# Patient Record
Sex: MALE | Race: WHITE | NOT HISPANIC OR LATINO | Employment: FULL TIME | URBAN - METROPOLITAN AREA
[De-identification: names, ages, dates, MRNs, and addresses within clinical notes are randomized per-mention and may not be internally consistent; named-entity substitution may affect disease eponyms.]

---

## 2021-05-29 ENCOUNTER — RECORDS - HEALTHEAST (OUTPATIENT)
Dept: ADMINISTRATIVE | Facility: CLINIC | Age: 31
End: 2021-05-29

## 2021-05-30 ENCOUNTER — RECORDS - HEALTHEAST (OUTPATIENT)
Dept: ADMINISTRATIVE | Facility: CLINIC | Age: 31
End: 2021-05-30

## 2022-12-02 ENCOUNTER — APPOINTMENT (OUTPATIENT)
Dept: RADIOLOGY | Facility: CLINIC | Age: 32
End: 2022-12-02
Attending: STUDENT IN AN ORGANIZED HEALTH CARE EDUCATION/TRAINING PROGRAM
Payer: COMMERCIAL

## 2022-12-02 ENCOUNTER — APPOINTMENT (OUTPATIENT)
Dept: MRI IMAGING | Facility: CLINIC | Age: 32
End: 2022-12-02
Attending: STUDENT IN AN ORGANIZED HEALTH CARE EDUCATION/TRAINING PROGRAM
Payer: COMMERCIAL

## 2022-12-02 ENCOUNTER — HOSPITAL ENCOUNTER (EMERGENCY)
Facility: CLINIC | Age: 32
Discharge: HOME | End: 2022-12-02
Attending: STUDENT IN AN ORGANIZED HEALTH CARE EDUCATION/TRAINING PROGRAM | Admitting: STUDENT IN AN ORGANIZED HEALTH CARE EDUCATION/TRAINING PROGRAM
Payer: COMMERCIAL

## 2022-12-02 VITALS
HEIGHT: 72 IN | BODY MASS INDEX: 23.42 KG/M2 | OXYGEN SATURATION: 98 % | SYSTOLIC BLOOD PRESSURE: 133 MMHG | TEMPERATURE: 99.9 F | HEART RATE: 79 BPM | RESPIRATION RATE: 15 BRPM | DIASTOLIC BLOOD PRESSURE: 87 MMHG | WEIGHT: 172.9 LBS

## 2022-12-02 DIAGNOSIS — R20.2 PARESTHESIAS: ICD-10-CM

## 2022-12-02 LAB
ALBUMIN SERPL-MCNC: 4.5 G/DL (ref 3.5–5)
ALP SERPL-CCNC: 54 U/L (ref 45–120)
ALT SERPL W P-5'-P-CCNC: 20 U/L (ref 0–45)
ANION GAP SERPL CALCULATED.3IONS-SCNC: 9 MMOL/L (ref 5–18)
AST SERPL W P-5'-P-CCNC: 17 U/L (ref 0–40)
ATRIAL RATE - MUSE: 72 BPM
BASOPHILS # BLD AUTO: 0 10E3/UL (ref 0–0.2)
BASOPHILS NFR BLD AUTO: 1 %
BILIRUB SERPL-MCNC: 1.4 MG/DL (ref 0–1)
BUN SERPL-MCNC: 11 MG/DL (ref 8–22)
CALCIUM SERPL-MCNC: 9.5 MG/DL (ref 8.5–10.5)
CHLORIDE BLD-SCNC: 104 MMOL/L (ref 98–107)
CO2 SERPL-SCNC: 28 MMOL/L (ref 22–31)
CREAT SERPL-MCNC: 0.9 MG/DL (ref 0.7–1.3)
D DIMER PPP FEU-MCNC: <=0.27 UG/ML FEU (ref 0–0.5)
DIASTOLIC BLOOD PRESSURE - MUSE: NORMAL MMHG
EOSINOPHIL # BLD AUTO: 0.4 10E3/UL (ref 0–0.7)
EOSINOPHIL NFR BLD AUTO: 7 %
ERYTHROCYTE [DISTWIDTH] IN BLOOD BY AUTOMATED COUNT: 12.5 % (ref 10–15)
GFR SERPL CREATININE-BSD FRML MDRD: >90 ML/MIN/1.73M2
GLUCOSE BLD-MCNC: 93 MG/DL (ref 70–125)
GLUCOSE BLDC GLUCOMTR-MCNC: 97 MG/DL (ref 70–99)
HCT VFR BLD AUTO: 46 % (ref 40–53)
HGB BLD-MCNC: 15.1 G/DL (ref 13.3–17.7)
IMM GRANULOCYTES # BLD: 0 10E3/UL
IMM GRANULOCYTES NFR BLD: 0 %
INTERPRETATION ECG - MUSE: NORMAL
LYMPHOCYTES # BLD AUTO: 2 10E3/UL (ref 0.8–5.3)
LYMPHOCYTES NFR BLD AUTO: 32 %
MAGNESIUM SERPL-MCNC: 2.2 MG/DL (ref 1.8–2.6)
MCH RBC QN AUTO: 27 PG (ref 26.5–33)
MCHC RBC AUTO-ENTMCNC: 32.8 G/DL (ref 31.5–36.5)
MCV RBC AUTO: 82 FL (ref 78–100)
MONOCYTES # BLD AUTO: 0.4 10E3/UL (ref 0–1.3)
MONOCYTES NFR BLD AUTO: 7 %
NEUTROPHILS # BLD AUTO: 3.4 10E3/UL (ref 1.6–8.3)
NEUTROPHILS NFR BLD AUTO: 53 %
NRBC # BLD AUTO: 0 10E3/UL
NRBC BLD AUTO-RTO: 0 /100
P AXIS - MUSE: 48 DEGREES
PLATELET # BLD AUTO: 283 10E3/UL (ref 150–450)
POTASSIUM BLD-SCNC: 4.1 MMOL/L (ref 3.5–5)
PR INTERVAL - MUSE: 140 MS
PROT SERPL-MCNC: 7.7 G/DL (ref 6–8)
QRS DURATION - MUSE: 124 MS
QT - MUSE: 408 MS
QTC - MUSE: 446 MS
R AXIS - MUSE: 72 DEGREES
RBC # BLD AUTO: 5.6 10E6/UL (ref 4.4–5.9)
SODIUM SERPL-SCNC: 141 MMOL/L (ref 136–145)
SYSTOLIC BLOOD PRESSURE - MUSE: NORMAL MMHG
T AXIS - MUSE: 62 DEGREES
TROPONIN I SERPL-MCNC: <0.01 NG/ML (ref 0–0.29)
VENTRICULAR RATE- MUSE: 72 BPM
WBC # BLD AUTO: 6.3 10E3/UL (ref 4–11)

## 2022-12-02 PROCEDURE — 83735 ASSAY OF MAGNESIUM: CPT | Performed by: STUDENT IN AN ORGANIZED HEALTH CARE EDUCATION/TRAINING PROGRAM

## 2022-12-02 PROCEDURE — 85379 FIBRIN DEGRADATION QUANT: CPT | Performed by: STUDENT IN AN ORGANIZED HEALTH CARE EDUCATION/TRAINING PROGRAM

## 2022-12-02 PROCEDURE — 99285 EMERGENCY DEPT VISIT HI MDM: CPT | Mod: 25

## 2022-12-02 PROCEDURE — 80053 COMPREHEN METABOLIC PANEL: CPT | Performed by: STUDENT IN AN ORGANIZED HEALTH CARE EDUCATION/TRAINING PROGRAM

## 2022-12-02 PROCEDURE — 85025 COMPLETE CBC W/AUTO DIFF WBC: CPT | Performed by: STUDENT IN AN ORGANIZED HEALTH CARE EDUCATION/TRAINING PROGRAM

## 2022-12-02 PROCEDURE — 255N000002 HC RX 255 OP 636: Performed by: EMERGENCY MEDICINE

## 2022-12-02 PROCEDURE — 71046 X-RAY EXAM CHEST 2 VIEWS: CPT

## 2022-12-02 PROCEDURE — 36415 COLL VENOUS BLD VENIPUNCTURE: CPT | Performed by: STUDENT IN AN ORGANIZED HEALTH CARE EDUCATION/TRAINING PROGRAM

## 2022-12-02 PROCEDURE — 96374 THER/PROPH/DIAG INJ IV PUSH: CPT | Mod: 59

## 2022-12-02 PROCEDURE — 93005 ELECTROCARDIOGRAM TRACING: CPT | Performed by: STUDENT IN AN ORGANIZED HEALTH CARE EDUCATION/TRAINING PROGRAM

## 2022-12-02 PROCEDURE — 250N000011 HC RX IP 250 OP 636: Performed by: STUDENT IN AN ORGANIZED HEALTH CARE EDUCATION/TRAINING PROGRAM

## 2022-12-02 PROCEDURE — A9585 GADOBUTROL INJECTION: HCPCS | Performed by: EMERGENCY MEDICINE

## 2022-12-02 PROCEDURE — 70549 MR ANGIOGRAPH NECK W/O&W/DYE: CPT

## 2022-12-02 PROCEDURE — 70553 MRI BRAIN STEM W/O & W/DYE: CPT

## 2022-12-02 PROCEDURE — 70544 MR ANGIOGRAPHY HEAD W/O DYE: CPT

## 2022-12-02 PROCEDURE — 84484 ASSAY OF TROPONIN QUANT: CPT | Performed by: STUDENT IN AN ORGANIZED HEALTH CARE EDUCATION/TRAINING PROGRAM

## 2022-12-02 RX ORDER — GADOBUTROL 604.72 MG/ML
10 INJECTION INTRAVENOUS ONCE
Status: COMPLETED | OUTPATIENT
Start: 2022-12-02 | End: 2022-12-02

## 2022-12-02 RX ORDER — CYCLOBENZAPRINE HCL 10 MG
10 TABLET ORAL 3 TIMES DAILY PRN
Qty: 20 TABLET | Refills: 0 | Status: SHIPPED | OUTPATIENT
Start: 2022-12-02 | End: 2022-12-08

## 2022-12-02 RX ORDER — KETOROLAC TROMETHAMINE 15 MG/ML
15 INJECTION, SOLUTION INTRAMUSCULAR; INTRAVENOUS ONCE
Status: COMPLETED | OUTPATIENT
Start: 2022-12-02 | End: 2022-12-02

## 2022-12-02 RX ADMIN — GADOBUTROL 10 ML: 604.72 INJECTION INTRAVENOUS at 18:01

## 2022-12-02 RX ADMIN — KETOROLAC TROMETHAMINE 15 MG: 15 INJECTION, SOLUTION INTRAMUSCULAR; INTRAVENOUS at 13:34

## 2022-12-02 ASSESSMENT — ACTIVITIES OF DAILY LIVING (ADL)
ADLS_ACUITY_SCORE: 33
ADLS_ACUITY_SCORE: 35

## 2022-12-02 NOTE — ED NOTES
Expected Patient Referral to ED  12:48 PM    Referring Clinic/Provider:  [facility]    Reason for referral/Clinical facts:  Upper neck pain since yesterday    Arm pain and chest pain and SOB        Recommendations provided:  Send to ED for further evaluation    Caller was informed that this institution does possess the capabilities and/or resources to provide for patient and should be transferred to our facility.    Discussed that if direct admit is sought and any hurdles are encountered, this ED would be happy to see the patient and evaluate.    Informed caller that recommendations provided are recommendations based only on the facts provided and that they responsible to accept or reject the advice, or to seek a formal in person consultation as needed and that this ED will see/treat patient should they arrive.      Manolo Aragon DO  [facility] EMERGENCY ROOM  [address] [address] [phone]       Manolo Aragon DO  12/02/22 1249

## 2022-12-02 NOTE — ED TRIAGE NOTES
Patient presents to the ED with right sided chest pain, neck pain, and right arm pain that started yesterday at 2pm. Was up in the middle of the night and noticed some right sided weakness to arm and leg and some difficulty with word finding. MD called to bedside for assessment. Blood sugar 97 in triage.     Triage Assessment     Row Name 12/02/22 1302       Triage Assessment (Adult)    Airway WDL WDL       Respiratory WDL    Respiratory WDL WDL       Skin Circulation/Temperature WDL    Skin Circulation/Temperature WDL WDL       Cardiac WDL    Cardiac WDL X;chest pain       Chest Pain Assessment    Chest Pain Location shoulder, right    Chest Pain Radiation arm    Character sharp       Peripheral/Neurovascular WDL    Peripheral Neurovascular WDL WDL       Cognitive/Neuro/Behavioral WDL    Cognitive/Neuro/Behavioral WDL WDL

## 2022-12-02 NOTE — ED NOTES
EMERGENCY DEPARTMENT SIGN OUT NOTE        ED COURSE AND MEDICAL DECISION MAKING  Patient was signed out to me by Dr Manolo Aragon at  4:55 PM     In brief, Jayesh Loya is a 32 year old male who initially presented with paresthesias.    At time of sign out, disposition was pending MRI    Fortunately, MRI is negative.  Will discharge with reassurance and outpatient follow-up.    FINAL IMPRESSION    1. Paresthesias        ED MEDS  Medications   ketorolac (TORADOL) injection 15 mg (15 mg Intravenous Given 12/2/22 1334)       LAB  Labs Ordered and Resulted from Time of ED Arrival to Time of ED Departure   COMPREHENSIVE METABOLIC PANEL - Abnormal       Result Value    Sodium 141      Potassium 4.1      Chloride 104      Carbon Dioxide (CO2) 28      Anion Gap 9      Urea Nitrogen 11      Creatinine 0.90      Calcium 9.5      Glucose 93      Alkaline Phosphatase 54      AST 17      ALT 20      Protein Total 7.7      Albumin 4.5      Bilirubin Total 1.4 (*)     GFR Estimate >90     GLUCOSE BY METER - Normal    GLUCOSE BY METER POCT 97     D DIMER QUANTITATIVE - Normal    D-Dimer Quantitative <=0.27     TROPONIN I - Normal    Troponin I <0.01     MAGNESIUM - Normal    Magnesium 2.2     CBC WITH PLATELETS AND DIFFERENTIAL    WBC Count 6.3      RBC Count 5.60      Hemoglobin 15.1      Hematocrit 46.0      MCV 82      MCH 27.0      MCHC 32.8      RDW 12.5      Platelet Count 283      % Neutrophils 53      % Lymphocytes 32      % Monocytes 7      % Eosinophils 7      % Basophils 1      % Immature Granulocytes 0      NRBCs per 100 WBC 0      Absolute Neutrophils 3.4      Absolute Lymphocytes 2.0      Absolute Monocytes 0.4      Absolute Eosinophils 0.4      Absolute Basophils 0.0      Absolute Immature Granulocytes 0.0      Absolute NRBCs 0.0             RADIOLOGY    Chest XR,  PA & LAT   Final Result   IMPRESSION: Lungs are clear. No pleural effusion. Heart size and pulmonary vascularity within normal limits.      MR Brain w/o & w  Contrast    (Results Pending)   MRA Brain ([place]) w Contrast    (Results Pending)   MRA Neck ([place]) w Contrast    (Results Pending)       DISCHARGE MEDS  New Prescriptions    No medications on file         Tanja Uribe M.D.   Emergency Medicine   [facility] EMERGENCY ROOM  [address] [address]  [phone]  Dept: [phone]      Tanja Uribe MD  12/02/22 1901

## 2022-12-02 NOTE — ED PROVIDER NOTES
Emergency Department Encounter         FINAL IMPRESSION:    Paresthesias, chest pain      ED COURSE AND MEDICAL DECISION MAKING   1:14 PM I met with patient for initial interview and encounter. PPE worn includes surgical mask and exam gloves.      ED Course as of 12/02/22 1407   Fri Dec 02, 2022   1345 Patient is a 32-year-old male who has no chronic medical problems, here with complaints of neck pain, headache, right arm pain, right arm paresthesias, right leg weakness, right arm weakness as well as anterior right chest discomfort as well as dysarthria and dysphagia around 3:57 AM in the morning.  Patient was initially seen at the urgent care within symptom here with concerns of right arm weakness as well as right leg weakness.  I was asked to see patient in triage.  When walking out there, patient looks well.  Vitals otherwise are stable other than some mild hypertension.  He looks well clinically.  His NIH here is 0.  He has normal strength.  Oral sensation.  No dysmetria.  No dysarthria.  He does have some right anterior chest discomfort that is worsened with palpation suggesting musculoskeletal component.  He has no rash on examination.  His heart and lungs are normal.  Plan for labs, MRI a MRI of the head and neck due to his dysarthria to rule out MS, or any other subclinical stroke syndrome.  Unlikely encephalitis or meningitis.  I suspect his chest discomfort is musculoskeletal in nature.  His D-dimer is negative.  Labs otherwise reassuring.  We will also obtain chest x-ray.       -MRI MRI pending.  Signed out to oncoming physician.  Patient remains neurologically intact here however MRI will rule out MS or any other intracranial process.  D-dimer negative, unlikely PE or dissection.  Chest x-ray is clear.  Troponin negative.                     Medical Decision Making    Supplemental history from: Family Member/Significant Other    External Record(s) Reviewed: Documented in HPI, if  "applicable.    Differential Diagnosis: See MDM charting for differential considered.     I performed an independent interpretation of the: EKG: See my documentation.    Discussed with radiology regarding test interpretation: Other: see ed course    Discussion of management with another provider: See ED Course    The following testing was considered but ultimately not selected: None    I considered prescription management with: N/A    The patient's care impacted: None    Consideration of Admission/Observation: Yes    Care significantly affected by Social Determinants of Health including: N/A                At the conclusion of the encounter I discussed the results of all the tests and the disposition. The questions were answered. The patient or family acknowledged understanding and was agreeable with the care plan.                  MEDICATIONS GIVEN IN THE EMERGENCY DEPARTMENT:  Medications   ketorolac (TORADOL) injection 15 mg (has no administration in time range)       NEW PRESCRIPTIONS STARTED AT TODAY'S ED VISIT:  New Prescriptions    No medications on file       HPI     Patient information obtained from: Patient     Use of Interpretor: N/A     Jayesh Loya is a 32 year old male with a pertinent history of chronic sinusitis, disturbance of skin sensation, and insomnia who presents to this ED via walk in with wife for evaluation of chest pain, neck pain and extremities weakness.      The patient presents with headache, sharp right side chest pain, neck pain and right arm weakness since yesterday at 3 AM. He also notice some slurred speech and difficulty communicating last night. At present, he states the chest pain is more dulled. The slurred speech is resolved. He also complains of tingling in his right arm. The patient explain that he haves intermittent chest pain that are like \"jolts\" that lasts only a few minutes. The patients main complaint is back right neck pain since it is new.The patient is a former " smoker. Drinks alcohol occasionally. Eats gummy bear sometimes.     Denies history of blood clots. The patient denies fever, shortness of breath, cough, abdominal pain, back pain, and any other complaints or concerns at the moment.         REVIEW OF SYSTEMS:  Review of Systems   Constitutional: Negative for fever, malaise  HEENT: Negative runny nose, sore throat, ear pain, neck pain  Respiratory: Negative for shortness of breath, cough, congestion  Cardiovascular: Negative for  leg edema  . Positive for right chest pain.     Gastrointestinal: Negative for abdominal distention, abdominal pain, constipation, vomiting, nausea, diarrhea  Genitourinary: Negative for dysuria and hematuria.   Integument: Negative for rash, skin breakdown  Neurological: Negative for paresthesias, weakness. Positive for  headache and right arm tingling. Positive for slurred speech (resolved).  Musculoskeletal: Negative for joint pain, joint swelling . Positive for right arm pain and right back neck.                        All other systems reviewed and are negative.          MEDICAL HISTORY     Past Medical History:   Diagnosis Date     NO ACTIVE PROBLEMS        Past Surgical History:   Procedure Laterality Date     HERNIORRHAPHY UMBILICAL N/A 1/6/2016    Procedure: HERNIORRHAPHY UMBILICAL;  Surgeon: Jesus Schwartz MD;  Location: [facility] OR     NO HISTORY OF SURGERY         Social History     Tobacco Use     Smoking status: Never   Substance Use Topics     Alcohol use: Yes     Comment: occ     Drug use: No       oxyCODONE-acetaminophen (PERCOCET) 5-325 MG per tablet            PHYSICAL EXAM     BP (!) 178/118 (BP Location: Left arm, Patient Position: Semi-Moralez's, Cuff Size: Adult Regular)   Pulse 91   Resp 19   Ht 1.829 m (6')   Wt 78.4 kg (172 lb 14.4 oz)   SpO2 97%   BMI 23.45 kg/m        PHYSICAL EXAM:     General: Patient appears well, nontoxic, comfortable  HEENT: Moist mucous membranes,  No head trauma.  No midline neck  pain.  Cardiovascular: Normal rate, normal rhythm, no extremity edema.  No appreciable murmur.              Respiratory: No signs of respiratory distress, lungs are clear to auscultation bilaterally with no wheezes rhonchi or rales.  Abdominal: Soft, nontender, nondistended, no palpable masses, no guarding, no rebound  Musculoskeletal: Right anterior chest discomfort that is worsend with palpation. Full range of motion of joints, no deformities  appreciated.Normal strength in all extremities.   Neurological: Alert and oriented, grossly neurologically intact. NIH is 0.   Psychological: Normal affect and mood.  Integument: No rashes appreciated                                                                                                                                             RESULTS       Labs Ordered and Resulted from Time of ED Arrival to Time of ED Departure   COMPREHENSIVE METABOLIC PANEL - Abnormal       Result Value    Sodium 141      Potassium 4.1      Chloride 104      Carbon Dioxide (CO2) 28      Anion Gap 9      Urea Nitrogen 11      Creatinine 0.90      Calcium 9.5      Glucose 93      Alkaline Phosphatase 54      AST 17      ALT 20      Protein Total 7.7      Albumin 4.5      Bilirubin Total 1.4 (*)     GFR Estimate >90     GLUCOSE BY METER - Normal    GLUCOSE BY METER POCT 97     D DIMER QUANTITATIVE - Normal    D-Dimer Quantitative <=0.27     TROPONIN I - Normal    Troponin I <0.01     MAGNESIUM - Normal    Magnesium 2.2     CBC WITH PLATELETS AND DIFFERENTIAL    WBC Count 6.3      RBC Count 5.60      Hemoglobin 15.1      Hematocrit 46.0      MCV 82      MCH 27.0      MCHC 32.8      RDW 12.5      Platelet Count 283      % Neutrophils 53      % Lymphocytes 32      % Monocytes 7      % Eosinophils 7      % Basophils 1      % Immature Granulocytes 0      NRBCs per 100 WBC 0      Absolute Neutrophils 3.4      Absolute Lymphocytes 2.0      Absolute Monocytes 0.4      Absolute Eosinophils 0.4       Absolute Basophils 0.0      Absolute Immature Granulocytes 0.0      Absolute NRBCs 0.0         No orders to display                     PROCEDURES:  Procedures:  Procedures       I, Autumn Her am serving as a scribe to document services personally performed by Manolo Aragon DO, based on my observations and the provider's statements to me.  I, Manolo Aragon DO, attest that Autumn Her is acting in a scribe capacity, has observed my performance of the services and has documented them in accordance with my direction.    Manolo Aragon DO  Emergency Medicine  [facility] EMERGENCY ROOM      Manolo Aragon DO  12/07/22 0742